# Patient Record
Sex: FEMALE | Race: WHITE
[De-identification: names, ages, dates, MRNs, and addresses within clinical notes are randomized per-mention and may not be internally consistent; named-entity substitution may affect disease eponyms.]

---

## 2018-07-17 ENCOUNTER — HOSPITAL ENCOUNTER (OUTPATIENT)
Dept: HOSPITAL 95 - MHTC | Age: 73
Discharge: HOME | End: 2018-07-17
Attending: INTERNAL MEDICINE
Payer: MEDICARE

## 2018-07-17 VITALS — HEIGHT: 65 IN | WEIGHT: 134.48 LBS | BODY MASS INDEX: 22.41 KG/M2

## 2018-07-17 DIAGNOSIS — Z88.2: ICD-10-CM

## 2018-07-17 DIAGNOSIS — E78.00: ICD-10-CM

## 2018-07-17 DIAGNOSIS — Z79.899: ICD-10-CM

## 2018-07-17 DIAGNOSIS — D50.9: ICD-10-CM

## 2018-07-17 DIAGNOSIS — I87.2: Primary | ICD-10-CM

## 2018-07-17 DIAGNOSIS — Z88.8: ICD-10-CM

## 2018-07-17 LAB — PROTHROMBIN TIME: 9.4 SEC (ref 9.7–11.5)

## 2018-07-17 PROCEDURE — 3E033TZ INTRODUCTION OF DESTRUCTIVE AGENT INTO PERIPHERAL VEIN, PERCUTANEOUS APPROACH: ICD-10-PCS | Performed by: INTERNAL MEDICINE

## 2018-09-11 ENCOUNTER — HOSPITAL ENCOUNTER (OUTPATIENT)
Dept: HOSPITAL 95 - MHTC | Age: 73
Discharge: HOME | End: 2018-09-11
Attending: INTERNAL MEDICINE
Payer: MEDICARE

## 2018-09-11 VITALS — BODY MASS INDEX: 22.41 KG/M2 | HEIGHT: 65 IN | WEIGHT: 134.48 LBS

## 2018-09-11 DIAGNOSIS — I83.812: Primary | ICD-10-CM

## 2018-09-11 DIAGNOSIS — Z87.891: ICD-10-CM

## 2018-09-11 DIAGNOSIS — I83.891: ICD-10-CM

## 2018-09-11 PROCEDURE — 3E033TZ INTRODUCTION OF DESTRUCTIVE AGENT INTO PERIPHERAL VEIN, PERCUTANEOUS APPROACH: ICD-10-PCS | Performed by: INTERNAL MEDICINE

## 2020-01-09 NOTE — NUR
SHIFT SUMMARY/DC
 
PT HAS HAD NO ACUTE CHANGES THIS SHIFT, REPORTS DIARRHEA X5 SINCE 0700, C/O
PAIN, REC ORDERS FOR HOME MED REGIMEN, REVIEWED DC INSTRUCTIONS W/PT & SPOUSE,
IV'S REMOVED, PT WALKED OUT FOR DC @ 1230.

## 2020-01-09 NOTE — NUR
SHIFT SUMMARY
ARRIVED TO MEDICAL FLOOR @ 1900 VIA STRETCHER. MINIMAL ASSISTANCE WITH
TRANSFER. A/O, ABLE TO MAKE NEEDS KNOWN. COOPERATIVE WITH CARE. CALLS AND
ANSWERS QUESTIONS APPROPRIATELY. C/O PAIN/DISCOMFORT TO ABDOMEN; MEDICATED PER
EMAR. RUNNING NS /c K+ @ 100 ML/HR. INDEPENDENT IN THE ROOM. VSS/AFEBRILE.
HYPERTENSIVE, BUT APPEARS ON TREND WITH PREVIOUS PRESSURES. HAS NOT REQUIRED
PRN HYDRALAZINE. HAS REMAINED WITHOUT N/V. NO ACUTE CHANGES NOTED OVERNIGHT.
BED IN LOWEST POSITION. CALL LIGHT AND BELONGINGS WITHIN REACH. WCTM. REPORT
TO ONCOMING RN.

## 2023-01-25 NOTE — NUR
ADMIT
 
PT ARRIVAL TO UNIT AT APPROX 0100 WITH L HIP FX + L WRIST FX IN SPLINT. PT
VERY PAINFUL WITH MOVEMENT AND REPORTS SEVERE 10/10 PAIN. LEFT LEG NOTED TO BE
EXTERNALLY ROTATED AND SHORTENED. REPOSITIONED WITH PILLOWS. SHE REPORTS
CHRONIC PANCREATIC PAIN IN WHICH SHE TAKES ORAL DILAUDID FOR. DR. KRUEGER
NOTIFIED OF HOME PAIN MED REGIMEN. SEE PAIN MANAGEMENT ORDERS. PT GIVEN 2MG
IV DILAUDID AND TOLERATED WELL. VSS BEFORE AND AFTER ADMINISTRATION. ICE
PACKS PLACED TO L HIP + L WRIST. L ARM ALSO ELEVATED. IVF INFUSING PER ORDERS
AND PT NPO. ZAPATA CATH IN PLACE AND DRAINING CLEAR YELLOW URINE. ORTHO
CONSULTED IN ER. ORIENTED TO ROOM AND CALL LIGHT.

## 2023-01-25 NOTE — NUR
SHIFT SUMMARY
 
NO ACUTE CHANGES SINCE ADMIT. 2MG IV DILAUDID FOR PAIN MANAGEMENT + ICE. NPO
WITH IVF INFUSING PER ORDERS. TELE IN PLACE. BENNY PATENT. USES CALL LIGHT
APPROPRIATELY.

## 2023-01-25 NOTE — NUR
Patient confirms NPO status and agrees with scheduled surgery.PT TO DAY
SURGERY IN HOSPITAL BED, PT A&OX4,  AT BEDSIDE.
Pre-Op teaching done. Pt verbalizes understanding. ALL QUESTIONS ANSWERED. PT
TO OR FOR SURGERY.

## 2023-01-25 NOTE — NUR
POST OP ARRIVAL
PT HAS ARRIVED IN HOSPITAL BED. TELE IN PLACE. ALERT, EYES OPEN. C/O 10/10
PAIN TO L HIP. AQUACELS x 2 w/ NO DRNG. 1+ EDEMA TO L HIP. PPP. L ARM ELEVATED
ON PILLOW. GOOD CAP REFILL; 1+ EDEMA. WIGGLES FINGERS. ICE CHIPS GIVEN.

## 2023-01-26 NOTE — NUR
SHIFT SUMMARY
PATIENT ALERT AND ORIENTED THROUGHOUT SHIFT. PAIN MANAGED WITH PAIN MED ORDERS
PER EMAR. 2 PERSON MAX ASSIST UP TO RECLINER. WBAT TO LEFT LEG, NWB TO LEFT
ARM. ZAPATA PATENT AND DRAINING. PALE AND WEAK. LATE AFTERNOON H&H LOW, REPORT
GIVEN TO NIGHT SHIFT RN. AQUACELS TO LEFT HIP C/D/I. SPLINT AND ACE TO LEFT
WRIST/ARM C/D/I. WIGGLES FINGERS, WARM TO TOUCH. TOLERATING REGULAR DIET AND
LIQUIDS.

## 2023-01-26 NOTE — NUR
INTRODUCED DAY SHIFT RN. PT HAS REFUSED REPOSITIONING THIS ENTIRE SHIFT OTHER
THAN SLIGHT SHIFTING AND SHE VERB FOR DAY SHIFT SHE WOULD BEGIN MOVING. DURING
INTRODUCTIONS I CONFIRMED THIS WITH PT AND SHE STATED WHE WILL.

## 2023-01-26 NOTE — NUR
SUMMARY
 PT RECEIVED PHENERGAN 12.5 MG IV AND ADDITIONAL 1 MG DILAUDID WHEN ORDERED,
AND PT REPORTED AS EFFECTIVE.THIS AM PT RECEIVED DILAUDID 8 MG PO AS PER HOME
ROUTINE AND HAS BEEN SLEEPING. PT WAKES EASILY.MAINTAINS SATS AND RESP RATE
STABLE.NO FURTHER C/O  NAUSEA.

## 2023-01-26 NOTE — NUR
PT HAVING NAUSEA TONIGHT.WANTED PO PAIN MEDS, THEN CHANGED MIND AND ASKED FOR
IV MEDS.ZOFRAN WAS GIVEN,HOWEVER PT HAD BILE COLORED EMESIS.I CALLED DR KRUEGER
AND DISCUSSED N/V AND REVIEWED MEDS GIVEN FOR NAUSEA AND PAIN. DR KRUEGER ORDERED
PHENERGAN PRN AND GAVE ADDITIONAL DILAUDID 1 MG IV AS PT REPORTED 2 MG AS
INEFFECTIVE STATING THAT THE ACT OF VOMITING MADE HER MORE PAINFUL.

## 2023-01-27 NOTE — NUR
SHIFT SUMMARY:
NO SIGNIFICANT CHANGES DURING SHIFT. PATIENT REPORTS 8/10 PAIN AND HAS BEEN
GIVEN IV DILAUDID, PO DILAUDID AS WELL AS PO FLEXIREL TO HELP MANAGE HER PAIN.
PATIENT WAS EDUCATED ON FOR THE IV DILAUDID TO BE GIVEN ONLY FOR BREAKTHROUGH
PAIN AND TO TRY AND ONLY HAVE THE ORAL PAIN MEDICATIONS SINCE THAT IS WHAT SHE
WILL BE GOING HOME WITH. PATIENT VERBALIZED UNDERSTANDING OF THIS NEWS.
PATIENT IS A 2 PERSON MODERATE ASSIST WITH THE ANNE WALKER AND GAIT BELT.
PATIENT IS TOLERATING PO INTAKE. PATIENT WAS BLADDER SCANNED AT 1430 AND DOES
NOT HAVE ENOUGH URINE IN HER BLADDER TO STRAIGHT CATH AT THIS TIME. PATIENT
REPORTS "I FEEL LIKE I DON'T NEED TO GO JUST YET". HER LEFT HIP HAS AN AQUACEL
THAT IS C/D/I. HER LEFT WRIST HAS A SOFT SPLINT THAT IS ALSO C/D/I. SHE DENIES
NUMBNESS OR TINGLING IN ALL EXTREMITIES. CALLS APPROPRIATELY. PATIENT IS
SITTING UP IN A CHAIR WITH CALL LIGHT IN REACH.
THE PLAN IS TO CONTINUE PAIN MANAGEMENT WITH MAINLY THE ORAL PAIN MEDICATIONS.
ONCE PAIN IS BETTER MANAGED PATIENT WILL BE DISCHARGED HOME WITH HOME HEALTH
SINCE PATIENT IS REFUSING SNF PLACEMENT PER PHYSICAL THERAPYS RECOMMENDATION.

## 2023-01-28 NOTE — NUR
PATIENTS  CAME INTO THE ROOM AND REQUESTED FOR TRANSPORT TO THEIR HOME
SINCE HIS CAR IS TOO TALL FOR HER TO GET IN.
THE  WAS ABLE TO COORDINATE [facility] TO COME AND PICK HER
UP. PATIENT USED HER ANNE WALKER TO MOVE FROM THE BED TO THE WHEELCHAIR AS A
SBA. HER LEFT AQUACEL DRESSINGS ARE C/D/I AS WELL AS HER LEFT WRIST SOFT
SPLINT. SHE IS BEING WHEELCHAIRED DOWN TO THE TRANSPORTS VAN TO BE TAKEN HOME.
HER  IS GOING TO MEET HER AT HOME.

## 2023-01-28 NOTE — NUR
SHIFT SUMMARY
NO ACUTE CHANGES OVERNIGHT. PT REPORTS CHRONIC PANCREATIC PAIN AND PAIN ON
RLE. PAIN MANAGED WITH PO DILAUDID. TRANSFUSION ENDED AT THE BEGINNING OF
SHIFT. ANOTHER TRANSFUSION STARTED AFTER THAT, PT TOLERATED IT WELL. PT HAS
SLEPT GOOD OVERNIGHT. VSS. AFEBRILE. TOLERATING PO INTAKE. REPORTS MILD
NAUSEA, DIDNT REQUIRE MEDS FOR IT. CALL LIGHT WITHIN REACH. WILL PROVIDE
REPORT TO ONCOMING NURSE.

## 2023-01-28 NOTE — NUR
DISCHARGE NOTE:
PATIENT WAS EDUCATED ON DISCHARGE INSTRUCTIONS. SHE VERBALIZED INSTRUCTIONS
AND HAS NO FURTHER QUESTIONS AT THIS TIME. PERSCRIPTIONS WERE FAXED TO HER
PREFERRED PHARMACY WHICH IS [facility]. PAIN IS MANAGED WITH PO
DILAUDID AND FLEXIREL. HER X2 AQUACELS ON HER HIP ARE C/D/I. AS WELL AS HER
SOFT SPLINT ON LEFT WRIST IS ALSO C/D/I. DENIES NUMBNESS AND TINGLING IN ALL
EXTREMITIES. CAN MOVE ALL FINGERS AND TOES. SHE IS TOLERATING PO INTAKE AND IS
VOIDING/PASSING FLATUS. PATIENT HAS HER PERSONAL ITEMS IN THE ROOM GATHERED.
SHE IS AWAITING FOR HER  TO ARRIVE WHO WILL TAKE HER HOME.